# Patient Record
Sex: FEMALE | Race: BLACK OR AFRICAN AMERICAN | NOT HISPANIC OR LATINO | Employment: UNEMPLOYED | URBAN - METROPOLITAN AREA
[De-identification: names, ages, dates, MRNs, and addresses within clinical notes are randomized per-mention and may not be internally consistent; named-entity substitution may affect disease eponyms.]

---

## 2022-10-02 ENCOUNTER — HOSPITAL ENCOUNTER (EMERGENCY)
Facility: HOSPITAL | Age: 32
Discharge: HOME | End: 2022-10-02
Attending: EMERGENCY MEDICINE | Admitting: EMERGENCY MEDICINE
Payer: COMMERCIAL

## 2022-10-02 VITALS
DIASTOLIC BLOOD PRESSURE: 61 MMHG | HEART RATE: 56 BPM | RESPIRATION RATE: 14 BRPM | HEIGHT: 69 IN | SYSTOLIC BLOOD PRESSURE: 133 MMHG | BODY MASS INDEX: 26.81 KG/M2 | TEMPERATURE: 96 F | OXYGEN SATURATION: 100 % | WEIGHT: 181 LBS

## 2022-10-02 DIAGNOSIS — R11.2 NAUSEA AND VOMITING, UNSPECIFIED VOMITING TYPE: ICD-10-CM

## 2022-10-02 DIAGNOSIS — F12.10 MARIJUANA ABUSE: ICD-10-CM

## 2022-10-02 LAB
ALBUMIN SERPL BCG-MCNC: 4.6 G/DL (ref 3.5–5.2)
ALBUMIN UR-MCNC: 20 MG/DL
ALP SERPL-CCNC: 63 U/L (ref 35–104)
ALT SERPL W P-5'-P-CCNC: 12 U/L (ref 10–35)
AMPHETAMINES UR QL SCN: ABNORMAL
ANION GAP SERPL CALCULATED.3IONS-SCNC: 14 MMOL/L (ref 7–15)
APPEARANCE UR: CLEAR
AST SERPL W P-5'-P-CCNC: 16 U/L (ref 10–35)
BARBITURATES UR QL SCN: ABNORMAL
BASOPHILS # BLD AUTO: 0 10E3/UL (ref 0–0.2)
BASOPHILS NFR BLD AUTO: 0 %
BENZODIAZ UR QL SCN: ABNORMAL
BILIRUB DIRECT SERPL-MCNC: <0.2 MG/DL (ref 0–0.3)
BILIRUB SERPL-MCNC: 0.5 MG/DL
BILIRUB UR QL STRIP: NEGATIVE
BUN SERPL-MCNC: 6.7 MG/DL (ref 6–20)
BZE UR QL SCN: ABNORMAL
CALCIUM SERPL-MCNC: 9.4 MG/DL (ref 8.6–10)
CANNABINOIDS UR QL SCN: ABNORMAL
CHLORIDE SERPL-SCNC: 102 MMOL/L (ref 98–107)
COLOR UR AUTO: YELLOW
CREAT SERPL-MCNC: 0.67 MG/DL (ref 0.51–0.95)
DEPRECATED HCO3 PLAS-SCNC: 21 MMOL/L (ref 22–29)
EOSINOPHIL # BLD AUTO: 0 10E3/UL (ref 0–0.7)
EOSINOPHIL NFR BLD AUTO: 0 %
ERYTHROCYTE [DISTWIDTH] IN BLOOD BY AUTOMATED COUNT: 13 % (ref 10–15)
GFR SERPL CREATININE-BSD FRML MDRD: >90 ML/MIN/1.73M2
GLUCOSE SERPL-MCNC: 162 MG/DL (ref 70–99)
GLUCOSE UR STRIP-MCNC: NEGATIVE MG/DL
HCG UR QL: NEGATIVE
HCT VFR BLD AUTO: 42.2 % (ref 35–47)
HGB BLD-MCNC: 14 G/DL (ref 11.7–15.7)
HGB UR QL STRIP: NEGATIVE
HOLD SPECIMEN: NORMAL
HYALINE CASTS: 1 /LPF
IMM GRANULOCYTES # BLD: 0.1 10E3/UL
IMM GRANULOCYTES NFR BLD: 1 %
KETONES UR STRIP-MCNC: ABNORMAL MG/DL
LEUKOCYTE ESTERASE UR QL STRIP: NEGATIVE
LIPASE SERPL-CCNC: 15 U/L (ref 13–60)
LYMPHOCYTES # BLD AUTO: 2.1 10E3/UL (ref 0.8–5.3)
LYMPHOCYTES NFR BLD AUTO: 13 %
MCH RBC QN AUTO: 29.9 PG (ref 26.5–33)
MCHC RBC AUTO-ENTMCNC: 33.2 G/DL (ref 31.5–36.5)
MCV RBC AUTO: 90 FL (ref 78–100)
MONOCYTES # BLD AUTO: 0.7 10E3/UL (ref 0–1.3)
MONOCYTES NFR BLD AUTO: 4 %
MUCOUS THREADS #/AREA URNS LPF: PRESENT /LPF
NEUTROPHILS # BLD AUTO: 13.3 10E3/UL (ref 1.6–8.3)
NEUTROPHILS NFR BLD AUTO: 82 %
NITRATE UR QL: NEGATIVE
NRBC # BLD AUTO: 0 10E3/UL
NRBC BLD AUTO-RTO: 0 /100
OPIATES UR QL SCN: ABNORMAL
PCP QUAL URINE (ROCHE): ABNORMAL
PH UR STRIP: 5.5 [PH] (ref 5–7)
PLATELET # BLD AUTO: 229 10E3/UL (ref 150–450)
POTASSIUM SERPL-SCNC: 3.6 MMOL/L (ref 3.4–5.3)
PROT SERPL-MCNC: 7.6 G/DL (ref 6.4–8.3)
RBC # BLD AUTO: 4.68 10E6/UL (ref 3.8–5.2)
RBC URINE: 2 /HPF
SODIUM SERPL-SCNC: 137 MMOL/L (ref 136–145)
SP GR UR STRIP: 1.03 (ref 1–1.03)
SQUAMOUS EPITHELIAL: <1 /HPF
UROBILINOGEN UR STRIP-MCNC: <2 MG/DL
WBC # BLD AUTO: 16 10E3/UL (ref 4–11)
WBC URINE: 2 /HPF

## 2022-10-02 PROCEDURE — 83690 ASSAY OF LIPASE: CPT | Performed by: EMERGENCY MEDICINE

## 2022-10-02 PROCEDURE — 85025 COMPLETE CBC W/AUTO DIFF WBC: CPT | Performed by: EMERGENCY MEDICINE

## 2022-10-02 PROCEDURE — 81025 URINE PREGNANCY TEST: CPT | Performed by: EMERGENCY MEDICINE

## 2022-10-02 PROCEDURE — 96374 THER/PROPH/DIAG INJ IV PUSH: CPT

## 2022-10-02 PROCEDURE — 96375 TX/PRO/DX INJ NEW DRUG ADDON: CPT

## 2022-10-02 PROCEDURE — 96376 TX/PRO/DX INJ SAME DRUG ADON: CPT

## 2022-10-02 PROCEDURE — 82248 BILIRUBIN DIRECT: CPT | Performed by: EMERGENCY MEDICINE

## 2022-10-02 PROCEDURE — 81001 URINALYSIS AUTO W/SCOPE: CPT | Performed by: EMERGENCY MEDICINE

## 2022-10-02 PROCEDURE — 258N000003 HC RX IP 258 OP 636: Performed by: EMERGENCY MEDICINE

## 2022-10-02 PROCEDURE — 93005 ELECTROCARDIOGRAM TRACING: CPT | Performed by: EMERGENCY MEDICINE

## 2022-10-02 PROCEDURE — 99285 EMERGENCY DEPT VISIT HI MDM: CPT | Mod: 25

## 2022-10-02 PROCEDURE — 250N000011 HC RX IP 250 OP 636: Performed by: EMERGENCY MEDICINE

## 2022-10-02 PROCEDURE — 82374 ASSAY BLOOD CARBON DIOXIDE: CPT | Performed by: EMERGENCY MEDICINE

## 2022-10-02 PROCEDURE — 80307 DRUG TEST PRSMV CHEM ANLYZR: CPT | Performed by: EMERGENCY MEDICINE

## 2022-10-02 PROCEDURE — 36415 COLL VENOUS BLD VENIPUNCTURE: CPT | Performed by: EMERGENCY MEDICINE

## 2022-10-02 RX ORDER — ONDANSETRON 2 MG/ML
4 INJECTION INTRAMUSCULAR; INTRAVENOUS ONCE
Status: COMPLETED | OUTPATIENT
Start: 2022-10-02 | End: 2022-10-02

## 2022-10-02 RX ORDER — LORAZEPAM 2 MG/ML
0.5 INJECTION INTRAMUSCULAR ONCE
Status: COMPLETED | OUTPATIENT
Start: 2022-10-02 | End: 2022-10-02

## 2022-10-02 RX ORDER — ONDANSETRON 4 MG/1
4 TABLET, ORALLY DISINTEGRATING ORAL EVERY 8 HOURS PRN
Qty: 10 TABLET | Refills: 0 | Status: SHIPPED | OUTPATIENT
Start: 2022-10-02 | End: 2022-10-05

## 2022-10-02 RX ADMIN — LORAZEPAM 0.5 MG: 2 INJECTION, SOLUTION INTRAMUSCULAR; INTRAVENOUS at 17:20

## 2022-10-02 RX ADMIN — ONDANSETRON 4 MG: 2 INJECTION INTRAMUSCULAR; INTRAVENOUS at 17:20

## 2022-10-02 RX ADMIN — ONDANSETRON 4 MG: 2 INJECTION INTRAMUSCULAR; INTRAVENOUS at 21:18

## 2022-10-02 RX ADMIN — SODIUM CHLORIDE 1000 ML: 9 INJECTION, SOLUTION INTRAVENOUS at 17:20

## 2022-10-02 ASSESSMENT — ACTIVITIES OF DAILY LIVING (ADL)
ADLS_ACUITY_SCORE: 35

## 2022-10-02 ASSESSMENT — ENCOUNTER SYMPTOMS
NAUSEA: 1
FEVER: 0
FREQUENCY: 0
DIAPHORESIS: 0
CHILLS: 0
DYSURIA: 0
DIARRHEA: 0
VOMITING: 1
HEMATURIA: 0
DIZZINESS: 1

## 2022-10-02 NOTE — ED TRIAGE NOTES
Triage Assessment     Row Name 10/02/22 0571       Triage Assessment (Adult)    Airway WDL WDL       Respiratory WDL    Respiratory WDL WDL       Skin Circulation/Temperature WDL    Skin Circulation/Temperature WDL WDL       Cardiac WDL    Cardiac WDL X;rhythm    Cardiac Rhythm bradycardic       Peripheral/Neurovascular WDL    Peripheral Neurovascular WDL WDL       Cognitive/Neuro/Behavioral WDL    Cognitive/Neuro/Behavioral WDL WDL

## 2022-10-02 NOTE — ED PROVIDER NOTES
EMERGENCY DEPARTMENT ENCOUNTER      NAME: Omari Murillo  AGE: 32 year old female  YOB: 1990  MRN: [id number]  EVALUATION DATE & TIME: 10/2/2022  4:41 PM    PCP: No primary care provider on file.    ED PROVIDER: Hilda Valdez MD      Chief Complaint   Patient presents with     Nausea     Dizziness         FINAL IMPRESSION:  1. Marijuana abuse    2. Nausea and vomiting, unspecified vomiting type          ED COURSE & MEDICAL DECISION MAKIN:48 PM Introduced myself to the patient, obtained history of present illness, and performed initial physical exam at this time. PPE: Provider wore gloves and paper mask. Pertinent Labs & Imaging studies reviewed. (See chart for details)  9:47 PM I rechecked on the patient. She is feeling improved.   11:00 PM I rechecked on the patient and the patient is feeling improved and comfortable with discharge home.     32 year old female presents to the Emergency Department for evaluation of dizziness.  Patient reports that at 1 PM today, she was going to  her kids when she suddenly felt dizzy.  She describes the dizziness as feeling as if she was going to faint, not as if the room is spinning.  She also with severe nausea and vomiting.  She admits to smoking marijuana earlier today.  She otherwise denies any abdominal pain.  Laboratory studies demonstrate positive for cannabinoid and amphetamines.  Otherwise, patient with a leukocytosis that is likely stress-induced.  No other clear findings.  I discussed with the patient that use of marijuana can cause hyperemesis and associated symptoms.  I recommend discontinuing use of any recreational drugs.  She verbalizes understanding and is feeling improved after antiemetics, IV fluids and ready to discharge home.    At the conclusion of the encounter I discussed the results of all of the tests and the disposition. The questions were answered. The patient or family acknowledged understanding and was agreeable  with the care plan.       MEDICATIONS GIVEN IN THE EMERGENCY:  Medications   ondansetron (ZOFRAN) injection 4 mg (4 mg Intravenous Given 10/2/22 1720)   LORazepam (ATIVAN) injection 0.5 mg (0.5 mg Intravenous Given 10/2/22 1720)   0.9% sodium chloride BOLUS (0 mLs Intravenous Stopped 10/2/22 1750)   ondansetron (ZOFRAN) injection 4 mg (4 mg Intravenous Given 10/2/22 2118)       NEW PRESCRIPTIONS STARTED AT TODAY'S ER VISIT  New Prescriptions    ONDANSETRON (ZOFRAN ODT) 4 MG ODT TAB    Take 1 tablet (4 mg) by mouth every 8 hours as needed for nausea          =================================================================    HPI    Patient information was obtained from: the patient.    Use of : N/A        Carmelifeoma Murillo is a 32 year old female with a no pertinent medical history who presents to this ED by ambulance for evaluation of dizziness.    Around 1:00 PM today, the patient was driving to go  her kids when she became dizzy and nauseous. She notes that she had to pull over to vomit. Patient states that she fell asleep in the car at one point. Her dizziness has been constant since onset, but is improved when lying down and closing her eyes. Patient reports that she experienced this 5-6 years ago, and her symptoms were due to dehydration. She notes that she feels like she is dehydrated. Patient has not eaten or drank anything today. Patient smokes tobacco and uses marijuana. She reports using marijuana this morning.  Patient denies fever, chills, sweats, cough, congestion, vision changes, diarrhea, urinary issues, and all other medical symptoms at this time.    REVIEW OF SYSTEMS   Review of Systems   Constitutional: Negative for chills, diaphoresis and fever.   HENT: Negative for congestion.    Eyes: Negative for visual disturbance.   Gastrointestinal: Positive for nausea and vomiting. Negative for diarrhea.   Genitourinary: Negative for dysuria, frequency and hematuria.   Neurological:  "Positive for dizziness.   All other systems reviewed and are negative.       PAST MEDICAL HISTORY:  No past medical history on file.    PAST SURGICAL HISTORY:  No past surgical history on file.        CURRENT MEDICATIONS:    ondansetron (ZOFRAN ODT) 4 MG ODT tab        ALLERGIES:  No Known Allergies    FAMILY HISTORY:  No family history on file.    SOCIAL HISTORY:   Social History     Socioeconomic History     Marital status: Single       VITALS:  /61   Pulse 56   Temp (!) 96  F (35.6  C) (Axillary)   Resp 14   Ht 1.753 m (5' 9\")   Wt 82.1 kg (181 lb)   SpO2 100%   BMI 26.73 kg/m      PHYSICAL EXAM    Constitutional: Well developed, Well nourished, NAD  HENT: Normocephalic, Atraumatic, Bilateral external ears normal, Oropharynx normal, mucous membranes moist, Nose normal.   Neck- Normal range of motion, No tenderness, Supple, No stridor.  Eyes: PERRL, EOMI, Conjunctiva normal, No discharge.   Respiratory: Normal breath sounds, No respiratory distress  Cardiovascular: Normal heart rate, Regular rhythm  GI: Bowel sounds normal, Soft, No tenderness,   Musculoskeletal: No edema. Good range of motion in all major joints. No tenderness to palpation or major deformities noted.   Integument: Warm, Dry, No erythema, No rash  Neurologic: Appears somnolent, but does respond to verbal stimulus and does answer my questions. Alert & oriented x 3, Normal motor function, Normal sensory function, No focal deficits noted. Normal gait.        LAB:  All pertinent labs reviewed and interpreted.  Results for orders placed or performed during the hospital encounter of 10/02/22   Extra Blue Top Tube   Result Value Ref Range    Hold Specimen JIC    Extra Red Top Tube   Result Value Ref Range    Hold Specimen JIC    Extra Green Top (Lithium Heparin) Tube   Result Value Ref Range    Hold Specimen JIC    Extra Purple Top Tube   Result Value Ref Range    Hold Specimen JIC    Basic metabolic panel   Result Value Ref Range    Sodium " 137 136 - 145 mmol/L    Potassium 3.6 3.4 - 5.3 mmol/L    Chloride 102 98 - 107 mmol/L    Carbon Dioxide (CO2) 21 (L) 22 - 29 mmol/L    Anion Gap 14 7 - 15 mmol/L    Urea Nitrogen 6.7 6.0 - 20.0 mg/dL    Creatinine 0.67 0.51 - 0.95 mg/dL    Calcium 9.4 8.6 - 10.0 mg/dL    Glucose 162 (H) 70 - 99 mg/dL    GFR Estimate >90 >60 mL/min/1.73m2   Result Value Ref Range    Lipase 15 13 - 60 U/L   Hepatic function panel   Result Value Ref Range    Protein Total 7.6 6.4 - 8.3 g/dL    Albumin 4.6 3.5 - 5.2 g/dL    Bilirubin Total 0.5 <=1.2 mg/dL    Alkaline Phosphatase 63 35 - 104 U/L    AST 16 10 - 35 U/L    ALT 12 10 - 35 U/L    Bilirubin Direct <0.20 0.00 - 0.30 mg/dL   UA with Microscopic reflex to Culture    Specimen: Urine, Midstream   Result Value Ref Range    Color Urine Yellow Colorless, Straw, Light Yellow, Yellow    Appearance Urine Clear Clear    Glucose Urine Negative Negative mg/dL    Bilirubin Urine Negative Negative    Ketones Urine Trace (A) Negative mg/dL    Specific Gravity Urine 1.032 (H) 1.001 - 1.030    Blood Urine Negative Negative    pH Urine 5.5 5.0 - 7.0    Protein Albumin Urine 20  (A) Negative mg/dL    Urobilinogen Urine <2.0 <2.0 mg/dL    Nitrite Urine Negative Negative    Leukocyte Esterase Urine Negative Negative    Mucus Urine Present (A) None Seen /LPF    RBC Urine 2 <=2 /HPF    WBC Urine 2 <=5 /HPF    Squamous Epithelials Urine <1 <=1 /HPF    Hyaline Casts Urine 1 <=2 /LPF   HCG qualitative urine   Result Value Ref Range    hCG Urine Qualitative Negative Negative   CBC with platelets and differential   Result Value Ref Range    WBC Count 16.0 (H) 4.0 - 11.0 10e3/uL    RBC Count 4.68 3.80 - 5.20 10e6/uL    Hemoglobin 14.0 11.7 - 15.7 g/dL    Hematocrit 42.2 35.0 - 47.0 %    MCV 90 78 - 100 fL    MCH 29.9 26.5 - 33.0 pg    MCHC 33.2 31.5 - 36.5 g/dL    RDW 13.0 10.0 - 15.0 %    Platelet Count 229 150 - 450 10e3/uL    % Neutrophils 82 %    % Lymphocytes 13 %    % Monocytes 4 %    % Eosinophils 0  %    % Basophils 0 %    % Immature Granulocytes 1 %    NRBCs per 100 WBC 0 <1 /100    Absolute Neutrophils 13.3 (H) 1.6 - 8.3 10e3/uL    Absolute Lymphocytes 2.1 0.8 - 5.3 10e3/uL    Absolute Monocytes 0.7 0.0 - 1.3 10e3/uL    Absolute Eosinophils 0.0 0.0 - 0.7 10e3/uL    Absolute Basophils 0.0 0.0 - 0.2 10e3/uL    Absolute Immature Granulocytes 0.1 <=0.4 10e3/uL    Absolute NRBCs 0.0 10e3/uL   Drug abuse screen 77 urine (FL, RH, SH)   Result Value Ref Range    Amphetamines Urine Screen Positive (A) Screen Negative    Barbituates Urine Screen Negative Screen Negative    Benzodiazepine Urine Screen Negative Screen Negative    Cannabinoids Urine Screen Positive (A) Screen Negative    Opiates Urine Screen Negative Screen Negative    PCP Urine Screen Negative Screen Negative    Cocaine Urine Screen Negative Screen Negative         EKG:    Performed at: 16:38    Impression: Sinus rhythm with sinus arrhythmia. ST changes in inferolateral and anterior leads. No previous ECGs available.    Rate: 63 bpm  Rhythm: sinus rhythm with sinus arrhythmia.    MI Interval: 144 ms  QRS Interval: 90 ms  QTc Interval: 437 ms  ST Changes: In inferolateral and anterior leads.  Comparison: No previous ECGs available.    I have independently reviewed and interpreted the EKG(s) documented above.    I, Geraldine Dickey, am serving as a scribe to document services personally performed by Hilda Valdez, based on my observation and the provider's statements to me. I, Hilda Valdez MD, attest that Geraldine Dickey is acting in a scribe capacity, has observed my performance of the services and has documented them in accordance with my direction.    Hilda Valdez MD  Emergency Medicine  [facility] EMERGENCY DEPARTMENT  [facility] [facility] [id number] [phone]       Hilda Valdez MD  10/02/22 4951

## 2022-10-02 NOTE — ED TRIAGE NOTES
The pt arrives via [facility]. The pt went out to her car and felt very weak and dizzy. Pt has had nausea and vomiting multiple times. NO PMH, no drug use. Blood sugar 186. VSS, the pt was leaving to  her kids

## 2022-10-03 LAB
ATRIAL RATE - MUSE: 63 BPM
DIASTOLIC BLOOD PRESSURE - MUSE: 71 MMHG
INTERPRETATION ECG - MUSE: NORMAL
P AXIS - MUSE: 68 DEGREES
PR INTERVAL - MUSE: 144 MS
QRS DURATION - MUSE: 90 MS
QT - MUSE: 428 MS
QTC - MUSE: 437 MS
R AXIS - MUSE: 85 DEGREES
SYSTOLIC BLOOD PRESSURE - MUSE: 133 MMHG
T AXIS - MUSE: 79 DEGREES
VENTRICULAR RATE- MUSE: 63 BPM

## 2022-10-03 NOTE — DISCHARGE INSTRUCTIONS
Your symptoms today are likely secondary to marijuana use.  Marijuana can cause severe nausea and vomiting.  It can happen once in a while when you use marijuana, does not need to happen every time you use marijuana.